# Patient Record
Sex: MALE | Race: WHITE | NOT HISPANIC OR LATINO | Employment: OTHER | URBAN - METROPOLITAN AREA
[De-identification: names, ages, dates, MRNs, and addresses within clinical notes are randomized per-mention and may not be internally consistent; named-entity substitution may affect disease eponyms.]

---

## 2021-09-23 NOTE — PROCEDURE NOTE: CLINICAL
PROCEDURE NOTE: Punctal Plugs, 0.4 mm Quintess Dissolvable (78831L, ) #1 Left Lower Lid. Prior to treatment, the risks/benefits/alternatives were discussed. The patient wished to proceed with procedure. Temporary collagen plugs were inserted. Patient tolerated procedure well. There were no complications. Post procedure instructions given. Drew Rueda PROCEDURE NOTE: Punctal Plugs, 0.4 mm Quintess Dissolvable (70959C, ) #1 Right Lower Lid. Prior to treatment, the risks/benefits/alternatives were discussed. The patient wished to proceed with procedure. Temporary collagen plugs were inserted. Patient tolerated procedure well. There were no complications. Post procedure instructions given. Drew Rueda

## 2022-05-27 ENCOUNTER — NEW PATIENT (OUTPATIENT)
Dept: URBAN - METROPOLITAN AREA CLINIC 35 | Facility: CLINIC | Age: 37
End: 2022-05-27

## 2022-05-27 DIAGNOSIS — H52.7: ICD-10-CM

## 2022-05-27 PROCEDURE — 92310-1 LEVEL 1 CONTACT LENS MANAGEMENT

## 2022-05-27 PROCEDURE — 92004 COMPRE OPH EXAM NEW PT 1/>: CPT

## 2022-05-27 PROCEDURE — 92015 DETERMINE REFRACTIVE STATE: CPT

## 2022-05-27 ASSESSMENT — VISUAL ACUITY
OS_CC: J1
OD_SC: J1
OU_CC: 20/20
OU_SC: J1
OS_CC: 20/20
OS_SC: 20/200
OU_SC: 20/70
OS_SC: J1
OD_SC: 20/70-2
OD_CC: 20/20
OU_CC: J1
OD_CC: J1

## 2022-05-27 ASSESSMENT — KERATOMETRY
OD_AXISANGLE_DEGREES: 170
OS_K2POWER_DIOPTERS: 44.25
OS_AXISANGLE2_DEGREES: 90
OD_K1POWER_DIOPTERS: 43.50
OS_K1POWER_DIOPTERS: 44.25
OS_AXISANGLE_DEGREES: 180
OD_K2POWER_DIOPTERS: 44.00
OD_AXISANGLE2_DEGREES: 80

## 2022-05-27 ASSESSMENT — TONOMETRY
OD_IOP_MMHG: 14
OS_IOP_MMHG: 15

## 2022-10-27 ENCOUNTER — EMERGENCY VISIT (OUTPATIENT)
Dept: URBAN - METROPOLITAN AREA CLINIC 38 | Facility: CLINIC | Age: 37
End: 2022-10-27

## 2022-10-27 DIAGNOSIS — H10.012: ICD-10-CM

## 2022-10-27 PROCEDURE — 92012 INTRM OPH EXAM EST PATIENT: CPT

## 2022-10-27 RX ORDER — TOBRAMYCIN AND DEXAMETHASONE 1; 3 MG/ML; MG/ML: 1 SUSPENSION/ DROPS OPHTHALMIC

## 2022-10-27 ASSESSMENT — VISUAL ACUITY
OS_CC: 20/20
OD_CC: 20/20

## 2022-10-27 ASSESSMENT — KERATOMETRY
OD_AXISANGLE_DEGREES: 170
OD_AXISANGLE2_DEGREES: 80
OD_K1POWER_DIOPTERS: 43.50
OD_K2POWER_DIOPTERS: 44.00
OS_AXISANGLE_DEGREES: 180
OS_K2POWER_DIOPTERS: 44.25
OS_K1POWER_DIOPTERS: 44.25
OS_AXISANGLE2_DEGREES: 90

## 2022-10-27 ASSESSMENT — TONOMETRY
OD_IOP_MMHG: 14
OS_IOP_MMHG: 16

## 2022-11-01 ASSESSMENT — KERATOMETRY
OD_K1POWER_DIOPTERS: 43.50
OS_K2POWER_DIOPTERS: 44.25
OD_AXISANGLE_DEGREES: 170
OS_AXISANGLE_DEGREES: 180
OD_AXISANGLE2_DEGREES: 80
OS_AXISANGLE2_DEGREES: 90
OD_K2POWER_DIOPTERS: 44.00
OS_K1POWER_DIOPTERS: 44.25

## 2022-11-02 ENCOUNTER — PREPPED CHART (OUTPATIENT)
Dept: URBAN - METROPOLITAN AREA CLINIC 38 | Facility: CLINIC | Age: 37
End: 2022-11-02

## 2023-05-30 ENCOUNTER — PREPPED CHART (OUTPATIENT)
Dept: URBAN - METROPOLITAN AREA CLINIC 35 | Facility: CLINIC | Age: 38
End: 2023-05-30